# Patient Record
Sex: FEMALE | Race: WHITE | NOT HISPANIC OR LATINO | URBAN - METROPOLITAN AREA
[De-identification: names, ages, dates, MRNs, and addresses within clinical notes are randomized per-mention and may not be internally consistent; named-entity substitution may affect disease eponyms.]

---

## 2020-07-30 ENCOUNTER — OFFICE VISIT (OUTPATIENT)
Dept: OPHTHALMOLOGY | Age: 30
End: 2020-07-30

## 2020-07-30 DIAGNOSIS — S05.01XA ABRASION OF RIGHT CORNEA, INITIAL ENCOUNTER: Primary | ICD-10-CM

## 2020-07-30 PROCEDURE — 92002 INTRM OPH EXAM NEW PATIENT: CPT | Performed by: OPHTHALMOLOGY

## 2020-08-03 ENCOUNTER — OFFICE VISIT (OUTPATIENT)
Dept: OPHTHALMOLOGY | Age: 30
End: 2020-08-03

## 2020-08-03 DIAGNOSIS — S05.01XD ABRASION OF RIGHT CORNEA, SUBSEQUENT ENCOUNTER: Primary | ICD-10-CM

## 2020-08-03 PROCEDURE — 99212 OFFICE O/P EST SF 10 MIN: CPT | Performed by: OPHTHALMOLOGY

## 2020-08-03 RX ORDER — PREDNISOLONE ACETATE 10 MG/ML
1 SUSPENSION/ DROPS OPHTHALMIC 4 TIMES DAILY
Qty: 5 ML | Refills: 0 | Status: SHIPPED | OUTPATIENT
Start: 2020-08-03

## 2024-08-30 PROCEDURE — 93010 ELECTROCARDIOGRAM REPORT: CPT | Performed by: INTERNAL MEDICINE

## 2025-02-10 ENCOUNTER — OFFICE VISIT (OUTPATIENT)
Dept: RHEUMATOLOGY | Facility: CLINIC | Age: 35
End: 2025-02-10
Payer: MEDICAID

## 2025-02-10 VITALS
RESPIRATION RATE: 16 BRPM | HEART RATE: 94 BPM | WEIGHT: 223.81 LBS | HEIGHT: 67 IN | BODY MASS INDEX: 35.13 KG/M2 | SYSTOLIC BLOOD PRESSURE: 116 MMHG | DIASTOLIC BLOOD PRESSURE: 68 MMHG | TEMPERATURE: 97 F | OXYGEN SATURATION: 97 %

## 2025-02-10 DIAGNOSIS — M35.9 AUTOIMMUNE DISEASE (HCC): ICD-10-CM

## 2025-02-10 DIAGNOSIS — G37.3 TRANSVERSE MYELITIS (HCC): Primary | ICD-10-CM

## 2025-02-10 DIAGNOSIS — I40.1: ICD-10-CM

## 2025-02-10 DIAGNOSIS — Z34.90 PREGNANCY, UNSPECIFIED GESTATIONAL AGE (HCC): ICD-10-CM

## 2025-02-10 PROBLEM — O09.90: Status: ACTIVE | Noted: 2024-11-24

## 2025-02-10 PROBLEM — I50.22 CHRONIC SYSTOLIC HEART FAILURE (HCC): Status: ACTIVE | Noted: 2024-09-04

## 2025-02-10 PROBLEM — E66.9 OBESITY: Status: ACTIVE | Noted: 2018-12-18

## 2025-02-10 PROBLEM — K50.90 CROHN'S DISEASE (HCC): Status: ACTIVE | Noted: 2018-11-19

## 2025-02-10 PROBLEM — O10.011 PRE-EXISTING ESSENTIAL HYPERTENSION DURING PREGNANCY IN FIRST TRIMESTER (HCC): Status: ACTIVE | Noted: 2024-11-15

## 2025-02-10 PROBLEM — M72.2 PLANTAR FASCIITIS: Status: ACTIVE | Noted: 2019-02-05

## 2025-02-10 PROBLEM — E55.9 VITAMIN D DEFICIENCY: Status: ACTIVE | Noted: 2024-12-30

## 2025-02-10 PROBLEM — R94.31 PROLONGED QT INTERVAL: Status: ACTIVE | Noted: 2024-09-03

## 2025-02-10 PROBLEM — R20.2 PARESTHESIA: Status: ACTIVE | Noted: 2024-09-03

## 2025-02-10 PROBLEM — O10.919 HTN IN PREGNANCY, CHRONIC (HCC): Status: ACTIVE | Noted: 2024-12-23

## 2025-02-10 PROBLEM — O23.01: Status: ACTIVE | Noted: 2024-12-06

## 2025-02-10 PROBLEM — B96.89 BACTERIAL VAGINOSIS IN PREGNANCY (HCC): Status: ACTIVE | Noted: 2024-12-30

## 2025-02-10 PROBLEM — O09.521 MULTIGRAVIDA OF ADVANCED MATERNAL AGE IN FIRST TRIMESTER (HCC): Status: ACTIVE | Noted: 2024-11-15

## 2025-02-10 PROBLEM — O13.3 GESTATIONAL HYPERTENSION, THIRD TRIMESTER (HCC): Status: ACTIVE | Noted: 2023-06-05

## 2025-02-10 PROBLEM — L84 CALLUS: Status: ACTIVE | Noted: 2019-02-05

## 2025-02-10 PROBLEM — D72.18: Status: ACTIVE | Noted: 2024-09-03

## 2025-02-10 PROBLEM — F12.90 MARIJUANA USE: Status: ACTIVE | Noted: 2024-09-03

## 2025-02-10 PROBLEM — I50.9 CHF (CONGESTIVE HEART FAILURE) (HCC): Status: ACTIVE | Noted: 2024-12-23

## 2025-02-10 PROBLEM — O23.599 BACTERIAL VAGINOSIS IN PREGNANCY (HCC): Status: ACTIVE | Noted: 2024-12-30

## 2025-02-10 PROBLEM — E87.6 HYPOKALEMIA: Status: ACTIVE | Noted: 2024-09-03

## 2025-02-10 PROBLEM — I10 HYPERTENSION: Status: ACTIVE | Noted: 2024-09-04

## 2025-02-10 PROBLEM — D72.829 LEUKOCYTOSIS: Status: ACTIVE | Noted: 2024-09-03

## 2025-02-10 PROBLEM — N30.01 ACUTE CYSTITIS WITH HEMATURIA: Status: ACTIVE | Noted: 2024-09-08

## 2025-02-10 PROBLEM — E66.9 OBESITY (BMI 30-39.9): Status: ACTIVE | Noted: 2024-12-09

## 2025-02-10 PROBLEM — R11.2 NAUSEA AND VOMITING: Status: ACTIVE | Noted: 2024-09-03

## 2025-02-10 PROBLEM — G35 MULTIPLE SCLEROSIS (HCC): Status: ACTIVE | Noted: 2024-09-01

## 2025-02-10 PROBLEM — E66.01 MORBID OBESITY (HCC): Chronic | Status: ACTIVE | Noted: 2023-01-11

## 2025-02-10 PROBLEM — N93.9 VAGINAL BLEEDING: Status: ACTIVE | Noted: 2024-09-08

## 2025-02-10 RX ORDER — LABETALOL 200 MG/1
200 TABLET, FILM COATED ORAL 3 TIMES DAILY
COMMUNITY
Start: 2024-12-07

## 2025-02-10 RX ORDER — IRBESARTAN 75 MG/1
75 TABLET ORAL DAILY
COMMUNITY
Start: 2024-10-11 | End: 2025-02-10

## 2025-02-10 RX ORDER — CHOLECALCIFEROL (VITAMIN D3) 25 MCG
1000 TABLET ORAL DAILY
COMMUNITY
Start: 2024-12-30 | End: 2025-02-10

## 2025-02-10 RX ORDER — ASPIRIN 81 MG/1
81 TABLET ORAL DAILY
COMMUNITY
Start: 2024-12-23 | End: 2025-12-23

## 2025-02-10 RX ORDER — FUROSEMIDE 20 MG/1
20 TABLET ORAL
COMMUNITY
Start: 2024-09-06

## 2025-02-10 RX ORDER — PRENATAL VIT/IRON FUM/FOLIC AC 27MG-0.8MG
1 TABLET ORAL DAILY
COMMUNITY
Start: 2024-12-23

## 2025-02-10 RX ORDER — ONDANSETRON 4 MG/1
1 TABLET, ORALLY DISINTEGRATING ORAL EVERY 6 HOURS PRN
COMMUNITY
Start: 2024-12-09

## 2025-02-10 RX ORDER — METOCLOPRAMIDE 10 MG/1
10 TABLET ORAL
COMMUNITY
Start: 2024-12-09 | End: 2025-02-10

## 2025-02-10 RX ORDER — SUCRALFATE 1 G/1
1 TABLET ORAL
COMMUNITY
Start: 2024-08-26 | End: 2025-02-10

## 2025-02-10 RX ORDER — CARVEDILOL 6.25 MG/1
6.25 TABLET ORAL AS DIRECTED
COMMUNITY
Start: 2024-10-11 | End: 2025-02-10

## 2025-02-10 RX ORDER — DICYCLOMINE HCL 20 MG
20 TABLET ORAL 3 TIMES DAILY PRN
COMMUNITY
Start: 2024-09-02 | End: 2025-02-10

## 2025-02-10 RX ORDER — FAMOTIDINE 20 MG/1
20 TABLET, FILM COATED ORAL 2 TIMES DAILY
COMMUNITY
Start: 2024-08-26 | End: 2025-02-10

## 2025-02-10 NOTE — PROGRESS NOTES
Rheumatology New Patient Note  =====================================================================================================    Date of visit: 2/10/2025  ?  Chief complaint: transverse myelitis  Chief Complaint   Patient presents with    New Patient     New patient referred by her primary.      Referring (will send letter)  Team Member Role and Specialty Contact Info Address Start End Comments   Nonstaff, Physician General - - 2/10/2025 - Liliana Vásquez with [facility].     =====================================================================================================  HPI    Nelda Main is a 34 year old female     Here for further evaluation of a systemic autoimmune process in context of transverse myelitis and myocarditis.  -Patient was in her usual state of health until September 2024.  -Developed neuropathic symptoms in September. Parasthesias from feet to torso  -Decreased mobility of the left arm. Left leg weakness. Feeling very weak in general. Banding around waist.  -given IV pulse steroids x 3 days which helped.  Neuropathic symptoms improved.  -hand stiffness still present to a degree.  -60% of her prior baseline in terms of strength.  -still with significant fatigued.   -She did note travel to [place] in April 2024.  Did have some sort of illness after she returned to [facility].  -banding/neuropathy have all resolved after the pulse steroids.  Never did take any steroids or any other immunomodulators after the pulse steroids in September 2024.  -4 months pregnant currently. Was prescribed aubagio but could not take it.   -was very busy prior this episode. Working 6 out of 7 days.  -With her episode of transverse myelitis, she developed myocarditis: was on colchicine for a period of time. Remains on aspirin.  No clear etiology from her myocarditis.  Her ejection fraction was bit low at 45% during her hospitalization with September for her transverse myelitis and myocarditis.   This has now normalized with repeat echo in December 2024.    December 2024: had pyelonephritis. Now on bactrim throughout pregnancy because she cannot symptomatically detect any urinary tract infections.    -seeing 2 separate neurology services in March/April 2025 for next steps.  -Aunt with Crohn's disease.    Dry mouth in the morning which is a bit worse. Started in the past month.  -4 healthy pregnancies  -2019: pre-eclampsia at 4th pregnancy, at 36 weeks.   -Denies miscarriages     Denies current alopecia, malar rash, photosensitivity rash, discoid lesions, oral/nasal ulcers, pleuritic chest pain, arthritis, seizures/psychosis, Raynaud's, dry eyes or blood clots.    14 point ROS negative except noted above    Medications:   aspirin 81 MG Oral Tab EC Take 1 tablet (81 mg total) by mouth daily.      furosemide 20 MG Oral Tab Take 1 tablet (20 mg total) by mouth daily as needed.      ondansetron 4 MG Oral Tablet Dispersible Take 1 tablet (4 mg total) by mouth every 6 (six) hours as needed.      Prenatal 27-0.8 MG Oral Tab Take 1 tablet by mouth daily.      labetalol 200 MG Oral Tab Take 1 tablet (200 mg total) by mouth in the morning, at noon, and at bedtime.      Sulfamethoxazole-Trimethoprim (BACTRIM OR) For remainder of pregnancy         Past Medical History:  Past Medical History:    Essential hypertension    Transverse myelitis (HCC)     Past Surgical History:  History reviewed. No pertinent surgical history.  Family History:  History reviewed. No pertinent family history.  Social History:  Social History     Socioeconomic History    Marital status:    Tobacco Use    Smoking status: Former     Types: Cigarettes    Smokeless tobacco: Never   Vaping Use    Vaping status: Never Used   Substance and Sexual Activity    Alcohol use: Never    Drug use: Never     Social Drivers of Health     Food Insecurity: No Food Insecurity (12/6/2024)    Received from [facility]    Food Insecurity      Currently or in the past 3 months, have you worried your food would run out before you had money to buy more?: No     In the past 12 months, have you run out of food or been unable to get more?: No   Transportation Needs: No Transportation Needs (2024)    Received from [facility]    Transportation Needs     Currently or in the past 3 months, has lack of transportation kept you from medical appointments, getting food or medicine, or providing care to a family member?: Unrecognized value     Medical Transportation Needs?: No   Stress: Not at Risk (2024)    Received from [facility]    Stress     Stress: 1   Housing Stability: Not at Risk (2024)    Received from [facility]    Housing Stability     Housin     ?  Allergies:  Allergies[1]      Objective    Vitals:    02/10/25 0912   BP: 116/68   Pulse: 94   Resp: 16   Temp: 96.8 °F (36 °C)   SpO2: 97%   Weight: 223 lb 12.8 oz (101.5 kg)   Height: 5' 7\" (1.702 m)       GEN: NAD, well-nourished.   HEENT: Head: NCAT. Face: No lesions. Eyes: Conjunctiva clear. Sclera are anicteric. PERRLA. EOMs are full. Ears: The right and left ear canals are clear.  Nose: No external or internal nasal deformities. Nasal septum is midline. Mouth: The lips are within normal limits.  No oral ulcers Tongue is midline with no lesions. The oral cavity is clear.   Neck: Supple. No neck masses. No thyromegaly. No LAD, parotid or submandicular gland palpated.   CV: RRR, no mrg, S1/S2  PULM: CTAB, no wrr, easy effort  Abd: s/nt/nd  Extremities: No cyanosis, edema or deformities.   Neurologic: Strength, CN2-12 grossly intact   Psych: normal affect.   Skin: No lesions or rashes.  MSK: 28 joint count performed. No evidence of synovitis in mcp, pip, dip, wrist, elbows, shoulders, hips, knees, ankles, mtp unless otherwise noted. Full ROM of elbows, wrists, knees.     Labs:    2024 (Through [facility])  KARISSA by IFA neg. STEFANI neg  Sed rate 2  NMO, Lyme, MOG neg  4 OCB in CSF  noted  Trop 1.1      No results found for: \"WBC\", \"RBC\", \"HGB\", \"HCT\", \"PLT\", \"MPV\", \"MCV\", \"MCH\", \"MCHC\", \"RDW\", \"NEPRELIM\", \"NEUTABS\", \"LYMPHABS\", \"EOSABS\", \"BASABS\", \"NEUT\", \"LYMPH\", \"MON\", \"EOS\", \"BASO\", \"NEPERCENT\", \"LYPERCENT\", \"MOPERCENT\", \"EOPERCENT\", \"BAPERCENT\", \"NE\", \"LYMABS\", \"MOABSO\", \"EOABSO\", \"BAABSO\"  No results found for: \"GLU\", \"BUN\", \"BUNCREA\", \"CREATSERUM\", \"ANIONGAP\", \"GFR\", \"GFRNAA\", \"GFRAA\", \"CA\", \"OSMOCALC\", \"ALKPHO\", \"AST\", \"ALT\", \"ALKPHOS\", \"BILT\", \"TP\", \"ALB\", \"GLOBULIN\", \"AGRATIO\", \"NA\", \"K\", \"CL\", \"CO2\"      No results found for: \"ANATI\", \"KARISSA\", \"ANAS\", \"ANASCRN\", \"ANASCRNRFLX\", \"LAURA\"  No results found for: \"SSA\", \"SSAUR\", \"ANTISSA\", \"SSA52\", \"SSA60\", \"SSADD\", \"SSB\", \"ANTISSB\"  No results found for: \"DSDNA\", \"ANTIDSDNA\", \"SMUD\", \"ANTISM\", \"SM\", \"RNP\", \"ANTIRNP\", \"SMITHRNP\"  No results found for: \"SCL70\", \"SCL\", \"SMVWVMV98\"  No results found for: \"C3\", \"C4\"  No results found for: \"DRVVT\", \"LAINT\", \"PTTLUPUS\", \"LUPUSINTERP\", \"LA\", \"N5TI1JMKOR\", \"C2SF8NPSLS\", \"O2BHLWACIS\", \"W6SOWFYVIG\"  No results found for: \"CARDIOLIPIGG\", \"CARDIOLIPIGM\", \"CARDIOLIPIGA\", \"CARDIOIGA\", \"CARLIP\"      Additional Labs:    Radiology:    9/2024 MRI C/T/L-spine:  1. Mild degenerative changes at multiple levels of the thoracic spine.     2. Findings as above which may be seen with Scheuermann's disease in the proper clinical setting.     3. No significant central canal stenosis/neural foraminal narrowing throughout the thoracic spine.     4. Questionable subtle signal changes involving the mid/distal thoracic spinal cord, which may be related to artifact versus subtle demyelinating disease. Otherwise visualized spinal cord grossly unremarkable. No abnormal focus of enhancement involving the visualized spinal cord.     5. Mild dural based enhancement at multiple levels may be related to nonspecific dural irritation/sequela of lumbar puncture.     6. Enhancement of multiple nerve roots may be related to nonspecific  inflammatory/reactive changes.   Radiology review:      =====================================================================================================  Assessment and Plan  Assessment:  1. Transverse myelitis (HCC)    2. Autoimmune disease (HCC)    3. Idiopathic myocarditis, unspecified chronicity (HCC)    4. Pregnancy, unspecified gestational age (HCC)      #Transverse myelitis  #Myocarditis  -Acute episode of paresthesias, weakness starting in distal lower extremities progressing to truncal region and upper extremities in September 2024.  4 oligoclonal bands and mid/distal thoracic spine demyelination.  Concerning for idiopathic transverse myelitis versus multiple sclerosis; NMO/MOG antibodies and KARISSA by IFA/EIA negative.  -Completed pulse dose steroids during this acute episode without any maintenance immunomodulatory therapy (neither corticosteroids or other steroid sparing therapy)  -Clinically the patient has improved significantly  -Myocarditis of unclear etiology: EF jana was 45%, given colchicine briefly and aspirin.  EF has now normalized.  -I see no distinct clinical or biochemical evidence of any systemic autoimmune disease such as lupus or Sjogren's or other vasculitis    #Pregnant:  -at 4 months.  ?  Plan:  -Repeat KARISSA by IFA and STEFANI panel, obtain antiphospholipid antibodies in the event there was a false negative KARISSA in the past  -Given travel to [place] months prior to transverse myelitis episode, will obtain cocci antibodies   -Patient will update me after she sees neurology in the coming months    Rtc prn     Diagnoses and all orders for this visit:    Transverse myelitis (HCC)  -     LUPUS ANTICOAGULANT REFLEX; Future  -     Anticardiolipin Ab, IGG, Qn; Future  -     Anticardiolipin AB, IGM, Qn; Future  -     Beta 2 Glycoprotein I AB, G/M; Future  -     KARISSA Screen,IFA, with Reflex to Titer and Pattern/Lupus Panel 3 [19881] [Q]  -     Miscellaneous Testing; Future    Autoimmune disease  (HCC)  -     LUPUS ANTICOAGULANT REFLEX; Future  -     Anticardiolipin Ab, IGG, Qn; Future  -     Anticardiolipin AB, IGM, Qn; Future  -     Beta 2 Glycoprotein I AB, G/M; Future  -     KARISSA Screen,IFA, with Reflex to Titer and Pattern/Lupus Panel 3 [19881] [Q]  -     Miscellaneous Testing; Future    Idiopathic myocarditis, unspecified chronicity (HCC)    Pregnancy, unspecified gestational age (HCC)        No follow-ups on file.      The above plan of care, diagnosis, orders, and follow-up were discussed with the patient. Questions related to this recommended plan of care were answered.    Thank you for referring this delightful patient to me. Please feel free to contact me with any questions.     This report was performed utilizing speech recognition software technology. Despite proofreading, speech recognition errors could escape detection. If a word or phrase is confusing or out of context, please do not hesitate to call for   clarification.       Kind regards      Moises Monterroso MD  [facility]         [1]   Allergies  Allergen Reactions    Penicillins DIZZINESS, HIVES, NAUSEA ONLY and RASH     Tolerated ceftriaxone for treatment of pyelo 12/24.    Capsaicin RASH     Redness, burning    Redness, burning

## 2025-05-06 ENCOUNTER — TELEPHONE (OUTPATIENT)
Facility: CLINIC | Age: 35
End: 2025-05-06

## 2025-05-06 NOTE — TELEPHONE ENCOUNTER
Call pt. Lupus, valley fever testing was negative. Great news. There were couple of tests that were not run with the last set of blood work including a beta-2 glycoprotein IgM/IgG, anticardiolipin IgM.  Please have her get this with the next set of blood work.  Can fax it over to her OBs office at [facility] or what ever location she would like.      5/3/2025  Cocci antibody is negative  Lupus anticoagulant, anticardiolipin IgG is negative  KARISSA by IFA neg

## 2025-05-07 NOTE — TELEPHONE ENCOUNTER
Left voice message to call office for test results and recommendation. Sent detailed MyChart message.